# Patient Record
Sex: MALE | Race: WHITE | Employment: OTHER | URBAN - METROPOLITAN AREA
[De-identification: names, ages, dates, MRNs, and addresses within clinical notes are randomized per-mention and may not be internally consistent; named-entity substitution may affect disease eponyms.]

---

## 2017-03-23 ENCOUNTER — OP HISTORICAL/CONVERTED ENCOUNTER (OUTPATIENT)
Dept: OTHER | Age: 67
End: 2017-03-23

## 2017-04-28 ENCOUNTER — OP HISTORICAL/CONVERTED ENCOUNTER (OUTPATIENT)
Dept: OTHER | Age: 67
End: 2017-04-28

## 2017-07-21 ENCOUNTER — OP HISTORICAL/CONVERTED ENCOUNTER (OUTPATIENT)
Dept: OTHER | Age: 67
End: 2017-07-21

## 2017-08-14 ENCOUNTER — OP HISTORICAL/CONVERTED ENCOUNTER (OUTPATIENT)
Dept: OTHER | Age: 67
End: 2017-08-14

## 2017-09-07 ENCOUNTER — OP HISTORICAL/CONVERTED ENCOUNTER (OUTPATIENT)
Dept: OTHER | Age: 67
End: 2017-09-07

## 2017-09-12 ENCOUNTER — IP HISTORICAL/CONVERTED ENCOUNTER (OUTPATIENT)
Dept: OTHER | Age: 67
End: 2017-09-12

## 2018-09-27 ENCOUNTER — OP HISTORICAL/CONVERTED ENCOUNTER (OUTPATIENT)
Dept: OTHER | Age: 68
End: 2018-09-27

## 2018-11-28 ENCOUNTER — OP HISTORICAL/CONVERTED ENCOUNTER (OUTPATIENT)
Dept: OTHER | Age: 68
End: 2018-11-28

## 2019-12-16 ENCOUNTER — ED HISTORICAL/CONVERTED ENCOUNTER (OUTPATIENT)
Dept: OTHER | Age: 69
End: 2019-12-16

## 2020-07-23 ENCOUNTER — ED HISTORICAL/CONVERTED ENCOUNTER (OUTPATIENT)
Dept: OTHER | Age: 70
End: 2020-07-23

## 2020-08-31 ENCOUNTER — TELEPHONE (OUTPATIENT)
Dept: INTERNAL MEDICINE CLINIC | Age: 70
End: 2020-08-31

## 2020-11-04 ENCOUNTER — TELEPHONE (OUTPATIENT)
Dept: INTERNAL MEDICINE CLINIC | Age: 70
End: 2020-11-04

## 2020-11-04 PROBLEM — J01.90 ACUTE SINUSITIS: Status: ACTIVE | Noted: 2020-11-04

## 2020-11-04 PROBLEM — M46.1 INFLAMMATION OF SACROILIAC JOINT (HCC): Status: ACTIVE | Noted: 2020-11-04

## 2020-11-04 PROBLEM — I10 BENIGN ESSENTIAL HYPERTENSION: Status: ACTIVE | Noted: 2020-11-04

## 2020-11-04 PROBLEM — M25.569 KNEE PAIN: Status: ACTIVE | Noted: 2020-11-04

## 2020-11-04 PROBLEM — G47.33 OBSTRUCTIVE SLEEP APNEA: Status: ACTIVE | Noted: 2020-11-04

## 2020-11-04 PROBLEM — M71.9 DISORDER OF BURSAE OF SHOULDER REGION: Status: ACTIVE | Noted: 2020-11-04

## 2020-11-04 PROBLEM — E78.5 HYPERLIPIDEMIA: Status: ACTIVE | Noted: 2020-11-04

## 2020-11-04 PROBLEM — R35.1 NOCTURIA: Status: ACTIVE | Noted: 2020-11-04

## 2020-11-04 PROBLEM — M19.019 OSTEOARTHRITIS OF SHOULDER: Status: ACTIVE | Noted: 2020-11-04

## 2020-11-04 PROBLEM — R00.1 BRADYCARDIA: Status: ACTIVE | Noted: 2020-11-04

## 2020-11-04 PROBLEM — J06.9 URI (UPPER RESPIRATORY INFECTION): Status: ACTIVE | Noted: 2020-11-04

## 2020-11-05 ENCOUNTER — OFFICE VISIT (OUTPATIENT)
Dept: INTERNAL MEDICINE CLINIC | Age: 70
End: 2020-11-05
Payer: MEDICARE

## 2020-11-05 VITALS
BODY MASS INDEX: 28.46 KG/M2 | OXYGEN SATURATION: 98 % | HEIGHT: 70 IN | HEART RATE: 98 BPM | DIASTOLIC BLOOD PRESSURE: 72 MMHG | WEIGHT: 198.8 LBS | RESPIRATION RATE: 18 BRPM | TEMPERATURE: 98 F | SYSTOLIC BLOOD PRESSURE: 132 MMHG

## 2020-11-05 DIAGNOSIS — Z99.89 OSA ON CPAP: ICD-10-CM

## 2020-11-05 DIAGNOSIS — N52.9 IMPOTENCE: ICD-10-CM

## 2020-11-05 DIAGNOSIS — Z12.11 COLON CANCER SCREENING: ICD-10-CM

## 2020-11-05 DIAGNOSIS — I10 BENIGN ESSENTIAL HYPERTENSION: ICD-10-CM

## 2020-11-05 DIAGNOSIS — M19.011 OSTEOARTHRITIS OF RIGHT SHOULDER, UNSPECIFIED OSTEOARTHRITIS TYPE: ICD-10-CM

## 2020-11-05 DIAGNOSIS — R60.0 LOCALIZED EDEMA: ICD-10-CM

## 2020-11-05 DIAGNOSIS — G47.33 OSA ON CPAP: ICD-10-CM

## 2020-11-05 DIAGNOSIS — Z23 ENCOUNTER FOR IMMUNIZATION: ICD-10-CM

## 2020-11-05 DIAGNOSIS — Z12.5 PROSTATE CANCER SCREENING: ICD-10-CM

## 2020-11-05 DIAGNOSIS — E78.00 PURE HYPERCHOLESTEROLEMIA: ICD-10-CM

## 2020-11-05 DIAGNOSIS — I10 ESSENTIAL HYPERTENSION: Primary | ICD-10-CM

## 2020-11-05 LAB
BILIRUB UR QL STRIP: NEGATIVE
GLUCOSE UR-MCNC: NEGATIVE MG/DL
KETONES P FAST UR STRIP-MCNC: NEGATIVE MG/DL
PH UR STRIP: 7.5 [PH] (ref 4.6–8)
PROT UR QL STRIP: NEGATIVE
SP GR UR STRIP: 1.02 (ref 1–1.03)
UA UROBILINOGEN AMB POC: NORMAL (ref 0.2–1)
URINALYSIS CLARITY POC: CLEAR
URINALYSIS COLOR POC: YELLOW
URINE BLOOD POC: NEGATIVE
URINE LEUKOCYTES POC: NEGATIVE
URINE NITRITES POC: NEGATIVE

## 2020-11-05 PROCEDURE — 99214 OFFICE O/P EST MOD 30 MIN: CPT | Performed by: INTERNAL MEDICINE

## 2020-11-05 PROCEDURE — 81003 URINALYSIS AUTO W/O SCOPE: CPT | Performed by: INTERNAL MEDICINE

## 2020-11-05 PROCEDURE — 90732 PPSV23 VACC 2 YRS+ SUBQ/IM: CPT | Performed by: INTERNAL MEDICINE

## 2020-11-05 RX ORDER — AMLODIPINE BESYLATE 5 MG/1
TABLET ORAL
Qty: 180 TAB | Refills: 1 | Status: SHIPPED | OUTPATIENT
Start: 2020-11-05 | End: 2021-05-03 | Stop reason: SDUPTHER

## 2020-11-05 RX ORDER — NAPROXEN SODIUM 220 MG
220 TABLET ORAL 2 TIMES DAILY WITH MEALS
COMMUNITY

## 2020-11-05 RX ORDER — TADALAFIL 10 MG/1
10 TABLET ORAL AS NEEDED
Qty: 12 TAB | Refills: 0 | Status: SHIPPED | OUTPATIENT
Start: 2020-11-05 | End: 2020-11-13

## 2020-11-05 NOTE — PROGRESS NOTES
Corine Snellen is a 81 y.o. male and presents with Annual Wellness Visit  Patient presents for follow-up has been seen in the past by Dr. Aiden Randall for shoulder surgery and is doing quite well continues with his exercises he is also followed by Dr. Maribeth Ramesh pulmonary specialist for obstructive sleep apnea and continues with the CPAP mask all doing well while here we did perform yearly Medicare wellness he did well on an Alzheimer's clock test and scored a 3 out of 3 on recent recall we discussed preventive care issues and he is requesting pneumococcal 23 vaccine is already received the flu vaccine October this year he has advanced directives we discussed tobacco and in which she never uses alcohol to overuse maintains physical activity walks daily 3 miles which takes him about an hour with his wife we discussed screening colonoscopy has had guaiac stool testing year ago but is agreeable for Cologuard testing and we discussed this he maintains a healthy diet hearing is fine we discussed vehicle home safety he does not have depression on screening we discussed erectile dysfunction.   He has been using Viagra without help did not want referral to the urologist for other procedures we discussed vascular leak low testosterone he was agreeable for trying a higher dose of Cialis which she had tried in the past low-dose and see if that helps he is aware the risk and benefits of the medication last labs October of last year yielded a glucose of 80 creatinine 0.92 potassium 4.5 normal hepatocellular enzymes cholesterol 214  TSH 2.4 PSA 2.4 and labs drawn today to recheck those studies by the office phlebotomist he has gained 1.4 pounds since last visit continues on amlodipine 10 mg tablet takes half twice a day wanted a 5 mg tablet take twice a day when he takes the whole 10 mg he says he does not feel right he has more peripheral edema he will consider shingles vaccine later hemoglobin 15.4 last year           Review of Systems  Constitutional: negative for fevers, chills, anorexia, weight loss and fatigue,no insomnia- noted TOSHA  Eyes:   negative for visual disturbance and irritation, eye discharge, eye pain. no eye redness. ENT:   negative for tinnitus, sore throat, nasal congestion, ear pain, hoarseness, hearing loss.,no snoring. Respiratory:  negative for cough, hemoptysis, shortness of breath, wheezing,  CV:   negative for chest pain, palpitations, lower extremity edema, shortness of breath while sitting, walking or at night-leg edema  GI:   negative for nausea, vomiting, diarrhea, abdominal pain,melena,constipation. Endo:               negative for polyuria, polydipsia, polyphagia, cold or heat intolerance,hair loss. Genitourinary: negative for frequency, dysuria and hematuria,urethral discharge,nocturia.straining while urination,urinary incontinence. Integument:  negative for rash and pruritus  Hematologic:  negative for easy bruising and gum/nose bleeding, enlarged nodes  Musculoskel: negative for myalgias, arthralgias, back pain, muscle weakness, joint pain, h/o fall,cramps,calf pain. djd  Neurological:  negative for headaches, dizziness, vertigo, memory problems, gait and seizures loss of consciousness,no ataxia.   Behavl/Psych: negative for feelings of anxiety, depression, mood changes ,sadness    Past Medical History:   Diagnosis Date    Acute sinusitis 11/4/2020    Benign essential hypertension 11/4/2020    Bradycardia 11/4/2020    Disorder of bursae of shoulder region 11/4/2020    Hyperlipidemia 11/4/2020    Inflammation of sacroiliac joint ([address] 11/4/2020    Knee pain 11/4/2020    Nocturia 11/4/2020    Obstructive sleep apnea 11/4/2020    Osteoarthritis of shoulder 11/4/2020    URI (upper respiratory infection) 11/4/2020     Past Surgical History:   Procedure Laterality Date    HX APPENDECTOMY  1968    HX COLONOSCOPY  2007    HX HERNIA REPAIR  2014    HX ORTHOPAEDIC  2017    total right shoulder replacement     Social History     Socioeconomic History    Marital status:      Spouse name: Not on file    Number of children: Not on file    Years of education: Not on file    Highest education level: Not on file   Tobacco Use    Smoking status: Never Smoker    Smokeless tobacco: Never Used   Substance and Sexual Activity    Alcohol use: Yes     Comment: moderate     Family History   Problem Relation Age of Onset    Hypertension Father      Current Outpatient Medications   Medication Sig Dispense Refill    naproxen sodium (NAPROSYN) 220 mg tablet Take 220 mg by mouth two (2) times daily (with meals).  tadalafiL (CIALIS) 10 mg tablet Take 1 Tab by mouth as needed for Erectile Dysfunction for up to 30 days. 12 Tab 0    amLODIPine (NORVASC) 5 mg tablet 1 bid 180 Tab 1     Allergies   Allergen Reactions    Ace Inhibitors Cough       Objective:  Visit Vitals  /72 (BP 1 Location: Right arm, BP Patient Position: Sitting)   Pulse 98   Temp 98 °F (36.7 °C) (Oral)   Resp 18   Ht 5' 10\" (1.778 m)   Wt 198 lb 12.8 oz (90.2 kg)   SpO2 98%   BMI 28.52 kg/m²       Physical Exam:   Constitutional: General Appearance: healthy-appearing and obese. Level of Distress: NAD. Ambulation: ambulating normally. Psychiatric: Mental Status: normal mood and affect and active and alert. Orientation: to time, place, and person. no agitation. ,normal eye contact. normal insight  Head: Head: normocephalic and atraumatic. Eyes: Pupils: PERRLA. Sclerae: non-icteric. ENMT: No lesions on external ear, no hearing loss. No lesions on external nose, sinus tenderness, or nasal discharge. Lips, Teeth, and no mouth or lip ulcers   Neck: Neck: supple, trachea midline, and no masses. Lymph Nodes: no cervical LAD. Thyroid: no enlargement or nodules and non-tender. Lungs: Respiratory effort: no dyspnea. Auscultation: no wheezing, rales/crackles, or rhonchi and breath sounds normal and good air movement.    Cardiovascular: Apical Impulse: not displaced. Heart Auscultation: normal S1 and S2; no murmurs, rubs, or gallops; and RRR. Neck vessels: no carotid bruits. Pulses including femoral / pedal: normal throughout. Abdomen: Bowel Sounds: normal. Inspection and Palpation: no tenderness, guarding, or masses and soft and non-distended. Liver: non-tender and no hepatomegaly. Spleen: non-tender and no splenomegaly. Musculoskeletal[de-identified] Extremities: no edema or varicosities. Calf tenderness. djd  Neurologic: Gait and Station: normal gait and station. Motor Strength normal right and left. Sensory and cerebellar intact. Skin: Inspection and palpation: no rash, lesions, or ulcer. Edema noted      Results for orders placed or performed in visit on 11/05/20   AMB POC URINALYSIS DIP STICK AUTO W/O MICRO   Result Value Ref Range    Color (UA POC) Yellow     Clarity (UA POC) Clear     Glucose (UA POC) Negative Negative    Bilirubin (UA POC) Negative Negative    Ketones (UA POC) Negative Negative    Specific gravity (UA POC) 1.025 1.001 - 1.035    Blood (UA POC) Negative Negative    pH (UA POC) 7.5 4.6 - 8.0    Protein (UA POC) Negative Negative    Urobilinogen (UA POC) 0.2 mg/dL 0.2 - 1    Nitrites (UA POC) Negative Negative    Leukocyte esterase (UA POC) Negative Negative       Assessment/Plan:    ICD-10-CM ICD-9-CM    1. Essential hypertension  I10 401.9 CBC WITH AUTOMATED DIFF      METABOLIC PANEL, COMPREHENSIVE      TSH 3RD GENERATION      AMB POC URINALYSIS DIP STICK AUTO W/O MICRO   2. Pure hypercholesterolemia  E78.00 272.0 LIPID PANEL   3. Prostate cancer screening  Z12.5 V76.44 PSA W/ REFLX FREE PSA   4. Encounter for immunization  Z23 V03.89 PNEUMOCOCCAL POLYSACCHARIDE VACCINE, 23-VALENT, ADULT OR IMMUNOSUPPRESSED PT DOSE,   5. Localized edema  R60.0 782.3    6. Impotence  N52.9 607.84    7. Osteoarthritis of right shoulder, unspecified osteoarthritis type  M19.011 715.91    8. Benign essential hypertension  I10 401.1    9.  TOSHA on CPAP G47.33 327.23     Z99.89 V46.8    10. Colon cancer screening  Z12.11 V76.51 COLOGUARD TEST (FECAL DNA COLORECTAL CANCER SCREENING)     Orders Placed This Encounter    PNEUMOCOCCAL POLYSACCHARIDE VACCINE, 23-VALENT, ADULT OR IMMUNOSUPPRESSED PT DOSE,    CBC WITH AUTOMATED DIFF    METABOLIC PANEL, COMPREHENSIVE    PSA W/ REFLX FREE PSA    LIPID PANEL    TSH 3RD GENERATION    COLOGUARD TEST (FECAL DNA COLORECTAL CANCER SCREENING)    AMB POC URINALYSIS DIP STICK AUTO W/O MICRO    naproxen sodium (NAPROSYN) 220 mg tablet     Sig: Take 220 mg by mouth two (2) times daily (with meals).  tadalafiL (CIALIS) 10 mg tablet     Sig: Take 1 Tab by mouth as needed for Erectile Dysfunction for up to 30 days. Dispense:  12 Tab     Refill:  0    amLODIPine (NORVASC) 5 mg tablet     Si bid     Dispense:  180 Tab     Refill:  1       call if any problems    There are no Patient Instructions on file for this visit. Follow-up and Dispositions    · Return in about 6 months (around 2021). Giovanna Donnelly is a 81 y.o. male and presents with Annual Wellness Visit             Review of Systems  Constitutional: negative for fevers, chills, anorexia, weight loss and fatigue,no insomnia  Eyes:   negative for visual disturbance and irritation, eye discharge, eye pain. no eye redness. ENT:   negative for tinnitus, sore throat, nasal congestion, ear pain, hoarseness, hearing loss.,no snoring. Respiratory:  negative for cough, hemoptysis, shortness of breath, wheezing,  CV:   negative for chest pain, palpitations, lower extremity edema, shortness of breath while sitting, walking or at night  GI:   negative for nausea, vomiting, diarrhea, abdominal pain,melena,constipation. Endo:               negative for polyuria, polydipsia, polyphagia, cold or heat intolerance,hair loss.   Genitourinary: negative for frequency, dysuria and hematuria,urethral discharge,nocturia.straining while urination,urinary incontinence. Integument:  negative for rash and pruritus  Hematologic:  negative for easy bruising and gum/nose bleeding, enlarged nodes  Musculoskel: negative for myalgias, arthralgias, back pain, muscle weakness, joint pain, h/o fall,cramps,calf pain. Neurological:  negative for headaches, dizziness, vertigo, memory problems, gait and seizures loss of consciousness,no ataxia. Behavl/Psych: negative for feelings of anxiety, depression, mood changes ,sadness    Past Medical History:   Diagnosis Date    Acute sinusitis 11/4/2020    Benign essential hypertension 11/4/2020    Bradycardia 11/4/2020    Disorder of bursae of shoulder region 11/4/2020    Hyperlipidemia 11/4/2020    Inflammation of sacroiliac joint ([address] 11/4/2020    Knee pain 11/4/2020    Nocturia 11/4/2020    Obstructive sleep apnea 11/4/2020    Osteoarthritis of shoulder 11/4/2020    URI (upper respiratory infection) 11/4/2020     Past Surgical History:   Procedure Laterality Date    HX APPENDECTOMY  1968    HX COLONOSCOPY  2007    HX HERNIA REPAIR  2014    HX ORTHOPAEDIC  2017    total right shoulder replacement     Social History     Socioeconomic History    Marital status:      Spouse name: Not on file    Number of children: Not on file    Years of education: Not on file    Highest education level: Not on file   Tobacco Use    Smoking status: Never Smoker    Smokeless tobacco: Never Used   Substance and Sexual Activity    Alcohol use: Yes     Comment: moderate     Family History   Problem Relation Age of Onset    Hypertension Father      Current Outpatient Medications   Medication Sig Dispense Refill    naproxen sodium (NAPROSYN) 220 mg tablet Take 220 mg by mouth two (2) times daily (with meals).  tadalafiL (CIALIS) 10 mg tablet Take 1 Tab by mouth as needed for Erectile Dysfunction for up to 30 days.  12 Tab 0    amLODIPine (NORVASC) 5 mg tablet 1 bid 180 Tab 1     Allergies   Allergen Reactions    Ace Inhibitors Cough       Objective:  Visit Vitals  /72 (BP 1 Location: Right arm, BP Patient Position: Sitting)   Pulse 98   Temp 98 °F (36.7 °C) (Oral)   Resp 18   Ht 5' 10\" (1.778 m)   Wt 198 lb 12.8 oz (90.2 kg)   SpO2 98%   BMI 28.52 kg/m²       Physical Exam:   Constitutional: General Appearance: healthy-appearing and obese. Level of Distress: NAD. Ambulation: ambulating normally. Psychiatric: Mental Status: normal mood and affect and active and alert. Orientation: to time, place, and person. no agitation. ,normal eye contact. normal insight  Head: Head: normocephalic and atraumatic. Eyes: Pupils: PERRLA. Sclerae: non-icteric. ENMT: No lesions on external ear, no hearing loss. No lesions on external nose, sinus tenderness, or nasal discharge. Lips, Teeth, and no mouth or lip ulcers   Neck: Neck: supple, trachea midline, and no masses. Lymph Nodes: no cervical LAD. Thyroid: no enlargement or nodules and non-tender. Lungs: Respiratory effort: no dyspnea. Auscultation: no wheezing, rales/crackles, or rhonchi and breath sounds normal and good air movement. Breast:   Cardiovascular: Apical Impulse: not displaced. Heart Auscultation: normal S1 and S2; no murmurs, rubs, or gallops; and RRR. Neck vessels: no carotid bruits. Pulses including femoral / pedal: normal throughout. Abdomen: Bowel Sounds: normal. Inspection and Palpation: no tenderness, guarding, or masses and soft and non-distended. Liver: non-tender and no hepatomegaly. Spleen: non-tender and no splenomegaly. :    Rectal Exam:  Musculoskeletal[de-identified] Extremities: no edema or varicosities. Calf tenderness. Neurologic: Gait and Station: normal gait and station. Motor Strength normal right and left. Sensory and cerebellar intact. Skin: Inspection and palpation: no rash, lesions, or ulcer.        Results for orders placed or performed in visit on 11/05/20   AMB POC URINALYSIS DIP STICK AUTO W/O MICRO   Result Value Ref Range    Color (UA POC) Yellow     Clarity (UA POC) Clear     Glucose (UA POC) Negative Negative    Bilirubin (UA POC) Negative Negative    Ketones (UA POC) Negative Negative    Specific gravity (UA POC) 1.025 1.001 - 1.035    Blood (UA POC) Negative Negative    pH (UA POC) 7.5 4.6 - 8.0    Protein (UA POC) Negative Negative    Urobilinogen (UA POC) 0.2 mg/dL 0.2 - 1    Nitrites (UA POC) Negative Negative    Leukocyte esterase (UA POC) Negative Negative       Assessment/Plan:    ICD-10-CM ICD-9-CM    1. Essential hypertension  I10 401.9 CBC WITH AUTOMATED DIFF      METABOLIC PANEL, COMPREHENSIVE      TSH 3RD GENERATION      AMB POC URINALYSIS DIP STICK AUTO W/O MICRO   2. Pure hypercholesterolemia  E78.00 272.0 LIPID PANEL   3. Prostate cancer screening  Z12.5 V76.44 PSA W/ REFLX FREE PSA   4. Encounter for immunization  Z23 V03.89 PNEUMOCOCCAL POLYSACCHARIDE VACCINE, 23-VALENT, ADULT OR IMMUNOSUPPRESSED PT DOSE,   5. Localized edema  R60.0 782.3    6. Impotence  N52.9 607.84    7. Osteoarthritis of right shoulder, unspecified osteoarthritis type  M19.011 715.91    8. Benign essential hypertension  I10 401.1    9. TOSHA on CPAP  G47.33 327.23     Z99.89 V46.8    10. Colon cancer screening  Z12.11 V76.51 COLOGUARD TEST (FECAL DNA COLORECTAL CANCER SCREENING)     Orders Placed This Encounter    PNEUMOCOCCAL POLYSACCHARIDE VACCINE, 23-VALENT, ADULT OR IMMUNOSUPPRESSED PT DOSE,    CBC WITH AUTOMATED DIFF    METABOLIC PANEL, COMPREHENSIVE    PSA W/ REFLX FREE PSA    LIPID PANEL    TSH 3RD GENERATION    COLOGUARD TEST (FECAL DNA COLORECTAL CANCER SCREENING)    AMB POC URINALYSIS DIP STICK AUTO W/O MICRO    naproxen sodium (NAPROSYN) 220 mg tablet     Sig: Take 220 mg by mouth two (2) times daily (with meals).  tadalafiL (CIALIS) 10 mg tablet     Sig: Take 1 Tab by mouth as needed for Erectile Dysfunction for up to 30 days.      Dispense:  12 Tab     Refill:  0    amLODIPine (NORVASC) 5 mg tablet     Si bid     Dispense:  180 Tab Refill:  1       increase physical activity, routine labs ordered, call if any problems    There are no Patient Instructions on file for this visit. Follow-up and Dispositions    · Return in about 6 months (around 5/5/2021).

## 2020-11-06 LAB
ALBUMIN SERPL-MCNC: 4.3 G/DL (ref 3.8–4.8)
ALBUMIN/GLOB SERPL: 1.5 {RATIO} (ref 1.2–2.2)
ALP SERPL-CCNC: 67 IU/L (ref 39–117)
ALT SERPL-CCNC: 24 IU/L (ref 0–44)
AST SERPL-CCNC: 21 IU/L (ref 0–40)
BASOPHILS # BLD AUTO: 0.1 X10E3/UL (ref 0–0.2)
BASOPHILS NFR BLD AUTO: 1 %
BILIRUB SERPL-MCNC: 0.6 MG/DL (ref 0–1.2)
BUN SERPL-MCNC: 18 MG/DL (ref 8–27)
BUN/CREAT SERPL: 17 (ref 10–24)
CALCIUM SERPL-MCNC: 9.2 MG/DL (ref 8.6–10.2)
CHLORIDE SERPL-SCNC: 107 MMOL/L (ref 96–106)
CHOLEST SERPL-MCNC: 194 MG/DL (ref 100–199)
CO2 SERPL-SCNC: 27 MMOL/L (ref 20–29)
CREAT SERPL-MCNC: 1.04 MG/DL (ref 0.76–1.27)
EOSINOPHIL # BLD AUTO: 0.3 X10E3/UL (ref 0–0.4)
EOSINOPHIL NFR BLD AUTO: 3 %
ERYTHROCYTE [DISTWIDTH] IN BLOOD BY AUTOMATED COUNT: 11.9 % (ref 11.6–15.4)
GLOBULIN SER CALC-MCNC: 2.8 G/DL (ref 1.5–4.5)
GLUCOSE SERPL-MCNC: 84 MG/DL (ref 65–99)
HCT VFR BLD AUTO: 46.8 % (ref 37.5–51)
HDLC SERPL-MCNC: 89 MG/DL
HGB BLD-MCNC: 15.6 G/DL (ref 13–17.7)
IMM GRANULOCYTES # BLD AUTO: 0 X10E3/UL (ref 0–0.1)
IMM GRANULOCYTES NFR BLD AUTO: 0 %
LDLC SERPL CALC-MCNC: 87 MG/DL (ref 0–99)
LYMPHOCYTES # BLD AUTO: 1.9 X10E3/UL (ref 0.7–3.1)
LYMPHOCYTES NFR BLD AUTO: 25 %
MCH RBC QN AUTO: 28.7 PG (ref 26.6–33)
MCHC RBC AUTO-ENTMCNC: 33.3 G/DL (ref 31.5–35.7)
MCV RBC AUTO: 86 FL (ref 79–97)
MONOCYTES # BLD AUTO: 0.9 X10E3/UL (ref 0.1–0.9)
MONOCYTES NFR BLD AUTO: 12 %
NEUTROPHILS # BLD AUTO: 4.5 X10E3/UL (ref 1.4–7)
NEUTROPHILS NFR BLD AUTO: 59 %
PLATELET # BLD AUTO: 290 X10E3/UL (ref 150–450)
POTASSIUM SERPL-SCNC: 4.3 MMOL/L (ref 3.5–5.2)
PROT SERPL-MCNC: 7.1 G/DL (ref 6–8.5)
PSA SERPL-MCNC: 2.5 NG/ML (ref 0–4)
RBC # BLD AUTO: 5.43 X10E6/UL (ref 4.14–5.8)
REFLEX CRITERIA: NORMAL
SODIUM SERPL-SCNC: 144 MMOL/L (ref 134–144)
TRIGL SERPL-MCNC: 107 MG/DL (ref 0–149)
TSH SERPL DL<=0.005 MIU/L-ACNC: 3.45 UIU/ML (ref 0.45–4.5)
VLDLC SERPL CALC-MCNC: 18 MG/DL (ref 5–40)
WBC # BLD AUTO: 7.6 X10E3/UL (ref 3.4–10.8)

## 2020-11-13 ENCOUNTER — TELEPHONE (OUTPATIENT)
Dept: INTERNAL MEDICINE CLINIC | Age: 70
End: 2020-11-13

## 2020-11-13 DIAGNOSIS — N52.9 ERECTILE DYSFUNCTION, UNSPECIFIED ERECTILE DYSFUNCTION TYPE: Primary | ICD-10-CM

## 2020-11-13 RX ORDER — SILDENAFIL 100 MG/1
100 TABLET, FILM COATED ORAL AS NEEDED
Qty: 12 TAB | Refills: 1 | Status: SHIPPED | OUTPATIENT
Start: 2020-11-13 | End: 2020-12-07

## 2021-01-26 ENCOUNTER — IMMUNIZATION (OUTPATIENT)
Dept: FAMILY MEDICINE CLINIC | Age: 71
End: 2021-01-26
Payer: MEDICARE

## 2021-01-26 DIAGNOSIS — Z23 ENCOUNTER FOR IMMUNIZATION: Primary | ICD-10-CM

## 2021-01-26 PROCEDURE — 96372 THER/PROPH/DIAG INJ SC/IM: CPT | Performed by: FAMILY MEDICINE

## 2021-01-26 PROCEDURE — 91301 COVID-19, MRNA, LNP-S, PF, 100MCG/0.5ML DOSE(MODERNA): CPT | Performed by: FAMILY MEDICINE

## 2021-02-24 ENCOUNTER — IMMUNIZATION (OUTPATIENT)
Dept: FAMILY MEDICINE CLINIC | Age: 71
End: 2021-02-24
Payer: MEDICARE

## 2021-02-24 DIAGNOSIS — Z23 ENCOUNTER FOR IMMUNIZATION: Primary | ICD-10-CM

## 2021-02-24 PROCEDURE — 0012A COVID-19, MRNA, LNP-S, PF, 100MCG/0.5ML DOSE(MODERNA): CPT | Performed by: FAMILY MEDICINE

## 2021-02-24 PROCEDURE — 91301 COVID-19, MRNA, LNP-S, PF, 100MCG/0.5ML DOSE(MODERNA): CPT | Performed by: FAMILY MEDICINE

## 2021-05-03 RX ORDER — AMLODIPINE BESYLATE 5 MG/1
TABLET ORAL
Qty: 180 TAB | Refills: 1 | Status: SHIPPED | OUTPATIENT
Start: 2021-05-03 | End: 2021-10-30

## 2021-05-05 ENCOUNTER — OFFICE VISIT (OUTPATIENT)
Dept: INTERNAL MEDICINE CLINIC | Age: 71
End: 2021-05-05
Payer: MEDICARE

## 2021-05-05 VITALS
BODY MASS INDEX: 28.35 KG/M2 | TEMPERATURE: 98 F | HEIGHT: 70 IN | RESPIRATION RATE: 18 BRPM | SYSTOLIC BLOOD PRESSURE: 122 MMHG | WEIGHT: 198 LBS | DIASTOLIC BLOOD PRESSURE: 72 MMHG | HEART RATE: 64 BPM | OXYGEN SATURATION: 99 %

## 2021-05-05 DIAGNOSIS — R01.1 SYSTOLIC MURMUR: ICD-10-CM

## 2021-05-05 DIAGNOSIS — M25.562 CHRONIC PAIN OF LEFT KNEE: ICD-10-CM

## 2021-05-05 DIAGNOSIS — E78.00 PURE HYPERCHOLESTEROLEMIA: ICD-10-CM

## 2021-05-05 DIAGNOSIS — G89.29 CHRONIC PAIN OF LEFT KNEE: ICD-10-CM

## 2021-05-05 DIAGNOSIS — I10 BENIGN ESSENTIAL HYPERTENSION: Primary | ICD-10-CM

## 2021-05-05 PROCEDURE — G8536 NO DOC ELDER MAL SCRN: HCPCS | Performed by: INTERNAL MEDICINE

## 2021-05-05 PROCEDURE — G8752 SYS BP LESS 140: HCPCS | Performed by: INTERNAL MEDICINE

## 2021-05-05 PROCEDURE — 1101F PT FALLS ASSESS-DOCD LE1/YR: CPT | Performed by: INTERNAL MEDICINE

## 2021-05-05 PROCEDURE — G8510 SCR DEP NEG, NO PLAN REQD: HCPCS | Performed by: INTERNAL MEDICINE

## 2021-05-05 PROCEDURE — 99214 OFFICE O/P EST MOD 30 MIN: CPT | Performed by: INTERNAL MEDICINE

## 2021-05-05 PROCEDURE — 3017F COLORECTAL CA SCREEN DOC REV: CPT | Performed by: INTERNAL MEDICINE

## 2021-05-05 PROCEDURE — G8754 DIAS BP LESS 90: HCPCS | Performed by: INTERNAL MEDICINE

## 2021-05-05 PROCEDURE — G8427 DOCREV CUR MEDS BY ELIG CLIN: HCPCS | Performed by: INTERNAL MEDICINE

## 2021-05-05 PROCEDURE — G8419 CALC BMI OUT NRM PARAM NOF/U: HCPCS | Performed by: INTERNAL MEDICINE

## 2021-05-05 NOTE — PROGRESS NOTES
Tim Rider is a 78 y.o. male and presents with Follow Up Chronic Condition (numbness in arms in the mornings)    Patient presents for follow-up he is doing quite well since his right dominant shoulder surgery by Dr. Doni Villatoro has not seen Dr. Doni Villatoro in almost a year he is doing well now he does continue his exercise daily range of motion is much improved he does have a 2/6 holosystolic murmur which is old we discussed the possibility of a 2D echo but the patient says he has had no syncopal no chest discomfort no problems and wishes to reevaluate later in the year when he returns again we did review his last labs November of last year cholesterol 194 triglycerides 107 LDL 87 PSA 2.5 white count 76-hemoglobin 15.6 platelet count [id number] patient has received the [place] coronavirus vaccine x2 we discussed coronavirus pandemic evaluation treatment and vaccine generally he is doing quite well labs November of last year also include glucose of 84 creatinine 1.04 sodium 144 potassium 4.3 normal hepatocellular enzymes-patient has some chronic left knee pain no crepitus range of motion okay but is not that bothersome and he did not want to follow-up with his orthopedist for anticipated surgical procedure later he will hold off for now 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 no eye redness. ENT:   negative for tinnitus, sore throat, nasal congestion, ear pain, hoarseness, hearing loss.,no snoring.   Respiratory:  negative for cough, hemoptysis, shortness of breath, wheezing,  CV:   negative for chest pain, palpitations, lower extremity edema, shortness of breath while sitting, walking or at night noted systolic murmur old  GI:   negative for nausea, vomiting, diarrhea, abdominal pain,melena,constipation. Endo:               negative for polyuria, polydipsia, polyphagia, cold or heat intolerance,hair loss. Genitourinary: negative for frequency, dysuria and hematuria,urethral discharge,nocturia.straining while urination,urinary incontinence. Integument:  negative for rash and pruritus  Hematologic:  negative for easy bruising and gum/nose bleeding, enlarged nodes  Musculoskel: negative for myalgias, arthralgias, back pain, muscle weakness, joint pain, h/o fall,cramps,calf pain. DJD particularly knees range of motion right arm at shoulder much improved  Neurological:  negative for headaches, dizziness, vertigo, memory problems, gait and seizures loss of consciousness,no ataxia.   Behavl/Psych: negative for feelings of anxiety, depression, mood changes ,sadness    Past Medical History:   Diagnosis Date    Acute sinusitis 11/4/2020    Benign essential hypertension 11/4/2020    Bradycardia 11/4/2020    Disorder of bursae of shoulder region 11/4/2020    Hyperlipidemia 11/4/2020    Inflammation of sacroiliac joint ([address] 11/4/2020    Knee pain 11/4/2020    Nocturia 11/4/2020    Obstructive sleep apnea 11/4/2020    Osteoarthritis of shoulder 11/4/2020    URI (upper respiratory infection) 11/4/2020     Past Surgical History:   Procedure Laterality Date    HX APPENDECTOMY  1968    HX COLONOSCOPY  2007    HX HERNIA REPAIR  2014    HX ORTHOPAEDIC  2017    total right shoulder replacement     Social History     Socioeconomic History    Marital status:      Spouse name: Not on file    Number of children: Not on file    Years of education: Not on file    Highest education level: Not on file   Tobacco Use    Smoking status: Never Smoker    Smokeless tobacco: Never Used   Substance and Sexual Activity    Alcohol use: Yes     Comment: moderate     Family History   Problem Relation Age of Onset    Hypertension Father Current Outpatient Medications   Medication Sig Dispense Refill    amLODIPine (NORVASC) 5 mg tablet 1 bid 180 Tab 1    naproxen sodium (NAPROSYN) 220 mg tablet Take 220 mg by mouth two (2) times daily (with meals). Allergies   Allergen Reactions    Ace Inhibitors Cough       Objective:  Visit Vitals  /72 (BP 1 Location: Left upper arm, BP Patient Position: Sitting)   Pulse 64   Temp 98 °F (36.7 °C) (Oral)   Resp 18   Ht 5' 10\" (1.778 m)   Wt 198 lb (89.8 kg)   SpO2 99%   BMI 28.41 kg/m²       Physical Exam:   Constitutional: General Appearance: healthy-appearing and obese. Level of Distress: NAD. Ambulation: ambulating normally. Psychiatric: Mental Status: normal mood and affect and active and alert. Orientation: to time, place, and person. no agitation. ,normal eye contact. normal insight  Head: Head: normocephalic and atraumatic. Eyes: Pupils: PERRLA. Sclerae: non-icteric. ENMT: No lesions on external ear, no hearing loss. No lesions on external nose, sinus tenderness, or nasal discharge. Lips, Teeth, and no mouth or lip ulcers   Neck: Neck: supple, trachea midline, and no masses. Lymph Nodes: no cervical LAD. Thyroid: no enlargement or nodules and non-tender. Lungs: Respiratory effort: no dyspnea. Auscultation: no wheezing, rales/crackles, or rhonchi and breath sounds normal and good air movement. Cardiovascular: Apical Impulse: not displaced. Heart Auscultation: normal S1 and S2; noted murmurs, rubs, or gallops; and RRR. Neck vessels: no carotid bruits. Pulses including femoral / pedal: normal throughout. Abdomen: Bowel Sounds: normal. Inspection and Palpation: no tenderness, guarding, or masses and soft and non-distended. Liver: non-tender  Musculoskeletal[de-identified] Extremities: no edema or varicosities. Calf tenderness. DJD changes  Neurologic: Gait and Station: normal gait and station. Motor Strength normal right and left. Sensory and cerebellar intact.   Skin: Inspection and palpation: no rash, lesions, or ulcer. Results for orders placed or performed in visit on 11/05/20   CBC WITH AUTOMATED DIFF   Result Value Ref Range    WBC 7.6 3.4 - 10.8 x10E3/uL    RBC 5.43 4.14 - 5.80 x10E6/uL    HGB 15.6 13.0 - 17.7 g/dL    HCT 46.8 37.5 - 51.0 %    MCV 86 79 - 97 fL    MCH 28.7 26.6 - 33.0 pg    MCHC 33.3 31.5 - 35.7 g/dL    RDW 11.9 11.6 - 15.4 %    PLATELET 600 267 - 977 x10E3/uL    NEUTROPHILS 59 Not Estab. %    Lymphocytes 25 Not Estab. %    MONOCYTES 12 Not Estab. %    EOSINOPHILS 3 Not Estab. %    BASOPHILS 1 Not Estab. %    ABS. NEUTROPHILS 4.5 1.4 - 7.0 x10E3/uL    Abs Lymphocytes 1.9 0.7 - 3.1 x10E3/uL    ABS. MONOCYTES 0.9 0.1 - 0.9 x10E3/uL    ABS. EOSINOPHILS 0.3 0.0 - 0.4 x10E3/uL    ABS. BASOPHILS 0.1 0.0 - 0.2 x10E3/uL    IMMATURE GRANULOCYTES 0 Not Estab. %    ABS. IMM. GRANS. 0.0 0.0 - 0.1 [id number]   METABOLIC PANEL, COMPREHENSIVE   Result Value Ref Range    Glucose 84 65 - 99 mg/dL    BUN 18 8 - 27 mg/dL    Creatinine 1.04 0.76 - 1.27 mg/dL    GFR est non-AA 74 >59 mL/min/1.73    GFR est AA 85 >59 mL/min/1.73    BUN/Creatinine ratio 17 10 - 24    Sodium 144 134 - 144 mmol/L    Potassium 4.3 3.5 - 5.2 mmol/L    Chloride 107 (H) 96 - 106 mmol/L    CO2 27 20 - 29 mmol/L    Calcium 9.2 8.6 - 10.2 mg/dL    Protein, total 7.1 6.0 - 8.5 g/dL    Albumin 4.3 3.8 - 4.8 g/dL    GLOBULIN, TOTAL 2.8 1.5 - 4.5 g/dL    A-G Ratio 1.5 1.2 - 2.2    Bilirubin, total 0.6 0.0 - 1.2 mg/dL    Alk.  phosphatase 67 39 - 117 IU/L    AST (SGOT) 21 0 - 40 IU/L    ALT (SGPT) 24 0 - 44 IU/L   PSA W/ REFLX FREE PSA   Result Value Ref Range    Prostate Specific Ag 2.5 0.0 - 4.0 ng/mL    Reflex Criteria Comment    LIPID PANEL   Result Value Ref Range    Cholesterol, total 194 100 - 199 mg/dL    Triglyceride 107 0 - 149 mg/dL    HDL Cholesterol 89 >39 mg/dL    VLDL, calculated 18 5 - 40 mg/dL    LDL, calculated 87 0 - 99 mg/dL   TSH 3RD GENERATION   Result Value Ref Range    TSH 3.450 0.450 - 4.500 uIU/mL   AMB POC URINALYSIS DIP STICK AUTO W/O MICRO   Result Value Ref Range    Color (UA POC) Yellow     Clarity (UA POC) Clear     Glucose (UA POC) Negative Negative    Bilirubin (UA POC) Negative Negative    Ketones (UA POC) Negative Negative    Specific gravity (UA POC) 1.025 1.001 - 1.035    Blood (UA POC) Negative Negative    pH (UA POC) 7.5 4.6 - 8.0    Protein (UA POC) Negative Negative    Urobilinogen (UA POC) 0.2 mg/dL 0.2 - 1    Nitrites (UA POC) Negative Negative    Leukocyte esterase (UA POC) Negative Negative       Assessment/Plan:    ICD-10-CM ICD-9-CM    1. Benign essential hypertension  I10 401.1    2. Systolic murmur  H23.9 033.8    3. Pure hypercholesterolemia  E78.00 272.0    4. Chronic pain of left knee  M25.562 719.46     G89.29 338.29      No orders of the defined types were placed in this encounter. call if any problems    There are no Patient Instructions on file for this visit.

## 2021-05-05 NOTE — PROGRESS NOTES
Chief Complaint   Patient presents with    Follow Up Chronic Condition               Visit Vitals  /72 (BP 1 Location: Left upper arm, BP Patient Position: Sitting)   Pulse 64   Temp 98 °F (36.7 °C) (Oral)   Resp 18   Ht 5' 10\" (1.778 m)   Wt 198 lb (89.8 kg)   SpO2 99%   BMI 28.41 kg/m²             1. Have you been to the ER, urgent care clinic since your last visit? Hospitalized since your last visit? No    2. Have you seen or consulted any other health care providers outside of the [address] [place] [address] since your last visit? Include any pap smears or colon screening.  NO

## 2021-10-30 RX ORDER — AMLODIPINE BESYLATE 5 MG/1
TABLET ORAL
Qty: 180 TABLET | Refills: 1 | Status: SHIPPED | OUTPATIENT
Start: 2021-10-30 | End: 2022-04-28 | Stop reason: SDUPTHER

## 2021-11-09 ENCOUNTER — OFFICE VISIT (OUTPATIENT)
Dept: INTERNAL MEDICINE CLINIC | Age: 71
End: 2021-11-09
Payer: MEDICARE

## 2021-11-09 VITALS
WEIGHT: 196.8 LBS | DIASTOLIC BLOOD PRESSURE: 84 MMHG | TEMPERATURE: 98 F | SYSTOLIC BLOOD PRESSURE: 128 MMHG | HEIGHT: 70 IN | OXYGEN SATURATION: 97 % | RESPIRATION RATE: 18 BRPM | HEART RATE: 62 BPM | BODY MASS INDEX: 28.18 KG/M2

## 2021-11-09 DIAGNOSIS — N52.9 IMPOTENCE: ICD-10-CM

## 2021-11-09 DIAGNOSIS — E78.00 PURE HYPERCHOLESTEROLEMIA: ICD-10-CM

## 2021-11-09 DIAGNOSIS — R53.83 OTHER FATIGUE: ICD-10-CM

## 2021-11-09 DIAGNOSIS — R01.1 SYSTOLIC MURMUR: ICD-10-CM

## 2021-11-09 DIAGNOSIS — G62.9 SENSORY NEUROPATHY: ICD-10-CM

## 2021-11-09 DIAGNOSIS — G47.33 OBSTRUCTIVE SLEEP APNEA: Primary | ICD-10-CM

## 2021-11-09 DIAGNOSIS — R20.2 ARM PARESTHESIA, LEFT: ICD-10-CM

## 2021-11-09 DIAGNOSIS — I10 BENIGN ESSENTIAL HYPERTENSION: ICD-10-CM

## 2021-11-09 DIAGNOSIS — N40.0 BENIGN PROSTATIC HYPERPLASIA, UNSPECIFIED WHETHER LOWER URINARY TRACT SYMPTOMS PRESENT: ICD-10-CM

## 2021-11-09 DIAGNOSIS — M19.011 PRIMARY OSTEOARTHRITIS OF RIGHT SHOULDER: ICD-10-CM

## 2021-11-09 DIAGNOSIS — R20.2 ARM PARESTHESIA, RIGHT: ICD-10-CM

## 2021-11-09 PROCEDURE — G8419 CALC BMI OUT NRM PARAM NOF/U: HCPCS | Performed by: INTERNAL MEDICINE

## 2021-11-09 PROCEDURE — G8754 DIAS BP LESS 90: HCPCS | Performed by: INTERNAL MEDICINE

## 2021-11-09 PROCEDURE — G8427 DOCREV CUR MEDS BY ELIG CLIN: HCPCS | Performed by: INTERNAL MEDICINE

## 2021-11-09 PROCEDURE — 1101F PT FALLS ASSESS-DOCD LE1/YR: CPT | Performed by: INTERNAL MEDICINE

## 2021-11-09 PROCEDURE — 99214 OFFICE O/P EST MOD 30 MIN: CPT | Performed by: INTERNAL MEDICINE

## 2021-11-09 PROCEDURE — G8752 SYS BP LESS 140: HCPCS | Performed by: INTERNAL MEDICINE

## 2021-11-09 PROCEDURE — G8510 SCR DEP NEG, NO PLAN REQD: HCPCS | Performed by: INTERNAL MEDICINE

## 2021-11-09 PROCEDURE — 3017F COLORECTAL CA SCREEN DOC REV: CPT | Performed by: INTERNAL MEDICINE

## 2021-11-09 PROCEDURE — G8536 NO DOC ELDER MAL SCRN: HCPCS | Performed by: INTERNAL MEDICINE

## 2021-11-09 NOTE — PROGRESS NOTES
Chief Complaint   Patient presents with    Physical     Visit Vitals  /84 (BP 1 Location: Left arm, BP Patient Position: Sitting, BP Cuff Size: Adult)   Pulse 62   Temp 98 °F (36.7 °C) (Oral)   Resp 18   Ht 5' 10\" (1.778 m)   Wt 196 lb 12.8 oz (89.3 kg)   SpO2 97%   BMI 28.24 kg/m²     1. Have you been to the ER, urgent care clinic since your last visit? Hospitalized since your last visit? No    2. Have you seen or consulted any other health care providers outside of the [address] [place] [address] since your last visit? Include any pap smears or colon screening.  No

## 2021-11-09 NOTE — PROGRESS NOTES
Stone Cannon is a 78 y.o. male and presents with Follow-up and Hypertension  Patient presents for general check we reviewed last labs from November of last year yielding a white count of 7600 hemoglobin 15.6 glucose 84 creatinine 1.04 sodium 144 potassium 4.3 calcium 9.2 total cholesterol 194 HDL 89 LDL 87 PSA 2.5 TSH 3.4 he continues on amlodipine 5 mg a day and naproxen over-the-counter and does well for occasional DJD pains he may retire from [employer] this year is fairly active still and is able to have good range of motion of the upper extremities status post his dominant right arm shoulder surgery by Dr. Alicia Faulkner. Review of systems is not remarkable he was agreeable for the office phlebotomist draw blood flow studies below he has received the [place] coronavirus vaccine x2 and is dissipated of booster blood pressure 130/80 left arm resting 128/78 right arm resting he has lost 2 pounds volitionally he was given Hemoccult cards to check for bleeding but he did have a year or so ago Cologuard which was negative he was agreeable for the office phlebotomist to draw blood for those studies below history of old systolic murmur and essential hypertension  -History of obstructive sleep apnea whereupon he wears a CPAP mask. He has been troubled lately with waking up during the night with arm pain when he lies on his left side he notes arm pain on the right arm below the elbow and the hand he gets up straightens out his arm and it goes away when he lies on his left arm or the left side his right arm which is flexed also develops numbness paresthesias which resolved with extension the arm no cervical neck pain he has limited range of motion of the neck due to arthritis in the spine.   He was agreeable to check labs first he may need imaging studies         Review of Systems  Constitutional: negative for fevers, chills, anorexia, weight loss and fatigue,no insomnia  Eyes:   negative for visual disturbance and irritation, eye discharge, eye pain. no eye redness. ENT:   negative for tinnitus, sore throat, nasal congestion, ear pain, hoarseness, hearing loss.,no snoring. Respiratory:  negative for cough, hemoptysis, shortness of breath, wheezing,  CV:   negative for chest pain, palpitations, lower extremity edema, shortness of breath while sitting, walking or at night  GI:   negative for nausea, vomiting, diarrhea, abdominal pain,melena,constipation. Endo:               negative for polyuria, polydipsia, polyphagia, cold or heat intolerance,hair loss. Genitourinary: negative for frequency, dysuria and hematuria,urethral discharge,nocturia.straining while urination,urinary incontinence. Integument:  negative for rash and pruritus  Hematologic:  negative for easy bruising and gum/nose bleeding, enlarged nodes  Musculoskel: negative for myalgias, arthralgias, back pain, muscle weakness, joint pain, h/o fall,cramps,calf pain. Neurological:  negative for headaches, dizziness, vertigo, memory problems, gait and seizures loss of consciousness,no ataxia.   Behavl/Psych: negative for feelings of anxiety, depression, mood changes ,sadness    Past Medical History:   Diagnosis Date    Acute sinusitis 11/4/2020    Benign essential hypertension 11/4/2020    Bradycardia 11/4/2020    Disorder of bursae of shoulder region 11/4/2020    Hyperlipidemia 11/4/2020    Inflammation of sacroiliac joint ([address] 11/4/2020    Knee pain 11/4/2020    Nocturia 11/4/2020    Obstructive sleep apnea 11/4/2020    Osteoarthritis of shoulder 11/4/2020    URI (upper respiratory infection) 11/4/2020     Past Surgical History:   Procedure Laterality Date    HX APPENDECTOMY  1968    HX COLONOSCOPY  2007    HX HERNIA REPAIR  2014    HX ORTHOPAEDIC  2017    total right shoulder replacement     Social History     Socioeconomic History    Marital status:    Tobacco Use    Smoking status: Never Smoker    Smokeless tobacco: Never Used   Vaping Use    Vaping Use: Never used   Substance and Sexual Activity    Alcohol use: Yes     Comment: moderate     Family History   Problem Relation Age of Onset    Hypertension Father      Current Outpatient Medications   Medication Sig Dispense Refill    amLODIPine (NORVASC) 5 mg tablet TAKE 1 TABLET BY MOUTH TWICE DAILY 180 Tablet 1    naproxen sodium (NAPROSYN) 220 mg tablet Take 220 mg by mouth two (2) times daily (with meals). Allergies   Allergen Reactions    Ace Inhibitors Cough       Objective:  Visit Vitals  /84 (BP 1 Location: Left arm, BP Patient Position: Sitting, BP Cuff Size: Adult)   Pulse 62   Temp 98 °F (36.7 °C) (Oral)   Resp 18   Ht 5' 10\" (1.778 m)   Wt 196 lb 12.8 oz (89.3 kg)   SpO2 97%   BMI 28.24 kg/m²       Physical Exam:   Constitutional: General Appearance: healthy-appearing and obese. Level of Distress: NAD. Ambulation: ambulating normally. Psychiatric: Mental Status: normal mood and affect and active and alert. Orientation: to time, place, and person. no agitation. ,normal eye contact. normal insight  Head: Head: normocephalic and atraumatic. Eyes: Pupils: PERRLA. Sclerae: non-icteric. ENMT: No lesions on external ear, no hearing loss. No lesions on external nose, sinus tenderness, or nasal discharge. Lips, Teeth, and no mouth or lip ulcers   Neck: . Thyroid: no enlargement or nodules and non-tender. Lungs: Respiratory effort: no dyspnea. Auscultation: no wheezing, rales/crackles, or rhonchi and breath sounds normal and good air movement. Cardiovascular:. ; history of murmur murmurs, rubs, or gallops; and RRR. Neck vessels: no carotid bruits. Pulses including femoral / pedal: normal throughout. History of murmur  Abdomen: Bowel Sounds: Hemoccult negative brown stool x3  Musculoskeletal[de-identified] Extremities: no edema or varicosities. Calf tenderness. DJD changes  Neurologic: Gait and Station: normal gait and station. Motor Strength normal right and left.  Sensory and cerebellar intact. Skin: Inspection and palpation: no rash, lesions, or ulcer. Results for orders placed or performed in visit on 11/09/21   CBC WITH AUTOMATED DIFF   Result Value Ref Range    WBC 6.4 3.4 - 10.8 x10E3/uL    RBC 5.40 4. 14 - 5.80 x10E6/uL    HGB 16.0 13.0 - 17.7 g/dL    HCT 47.6 37.5 - 51.0 %    MCV 88 79 - 97 fL    MCH 29.6 26.6 - 33.0 pg    MCHC 33.6 31.5 - 35.7 g/dL    RDW 12.3 11.6 - 15.4 %    PLATELET 947 890 - 668 x10E3/uL    NEUTROPHILS 53 Not Estab. %    Lymphocytes 28 Not Estab. %    MONOCYTES 13 Not Estab. %    EOSINOPHILS 4 Not Estab. %    BASOPHILS 1 Not Estab. %    ABS. NEUTROPHILS 3.4 1.4 - 7.0 x10E3/uL    Abs Lymphocytes 1.8 0.7 - 3.1 x10E3/uL    ABS. MONOCYTES 0.8 0.1 - 0.9 x10E3/uL    ABS. EOSINOPHILS 0.3 0.0 - 0.4 x10E3/uL    ABS. BASOPHILS 0.0 0.0 - 0.2 x10E3/uL    IMMATURE GRANULOCYTES 1 Not Estab. %    ABS. IMM. GRANS. 0.0 0.0 - 0.1 [id number]   METABOLIC PANEL, COMPREHENSIVE   Result Value Ref Range    Glucose 66 65 - 99 mg/dL    BUN 16 8 - 27 mg/dL    Creatinine 0.90 0.76 - 1.27 mg/dL    GFR est non-AA 87 >59 mL/min/1.73    GFR est  >59 mL/min/1.73    BUN/Creatinine ratio 18 10 - 24    Sodium 142 134 - 144 mmol/L    Potassium 4.4 3.5 - 5.2 mmol/L    Chloride 102 96 - 106 mmol/L    CO2 23 20 - 29 mmol/L    Calcium 9.7 8.6 - 10.2 mg/dL    Protein, total 6.7 6.0 - 8.5 g/dL    Albumin 4.3 3.8 - 4.8 g/dL    GLOBULIN, TOTAL 2.4 1.5 - 4.5 g/dL    A-G Ratio 1.8 1.2 - 2.2    Bilirubin, total 0.6 0.0 - 1.2 mg/dL    Alk.  phosphatase 60 44 - 121 IU/L    AST (SGOT) 20 0 - 40 IU/L    ALT (SGPT) 22 0 - 44 IU/L   PSA W/ REFLX FREE PSA   Result Value Ref Range    Prostate Specific Ag 2.8 0.0 - 4.0 ng/mL    Reflex Criteria Comment    TSH 3RD GENERATION   Result Value Ref Range    TSH 2.350 0.450 - 4.500 uIU/mL   LIPID PANEL   Result Value Ref Range    Cholesterol, total 191 100 - 199 mg/dL    Triglyceride 71 0 - 149 mg/dL    HDL Cholesterol 77 >39 mg/dL    VLDL, calculated 13 5 - 40 mg/dL    LDL, calculated 101 (H) 0 - 99 mg/dL   T4, FREE   Result Value Ref Range    T4, Free 1.30 0.82 - 1.77 ng/dL   RADHA, DIRECT, W/REFLEX   Result Value Ref Range    Antinuclear Antibodies Direct Negative Negative   SED RATE (ESR)   Result Value Ref Range    Sed rate (ESR) 14 0 - 30 mm/hr       Assessment/Plan:    ICD-10-CM ICD-9-CM    1. Obstructive sleep apnea  G47.33 327.23 CBC WITH AUTOMATED DIFF      METABOLIC PANEL, COMPREHENSIVE   2. Benign essential hypertension  I10 401.1 CBC WITH AUTOMATED DIFF      METABOLIC PANEL, COMPREHENSIVE   3. Primary osteoarthritis of right shoulder  M19.011 715.11    4. Pure hypercholesterolemia  E78.00 272.0 TSH 3RD GENERATION      T4, FREE   5. Impotence  N52.9 607.84 LIPID PANEL   6. Benign prostatic hyperplasia, unspecified whether lower urinary tract symptoms present  N40.0 600.00 PSA W/ REFLX FREE PSA   7. Sensory neuropathy  G62.9 356.9 SED RATE (ESR)      T4, FREE      SED RATE (ESR)      RADHA, DIRECT, W/REFLEX   8. Other fatigue  R53.83 780.79 SED RATE (ESR)      T4, FREE      SED RATE (ESR)      RADHA, DIRECT, W/REFLEX   9. Arm paresthesia, right  R20.2 782.0    10. Arm paresthesia, left  R20.2 782.0    11. Systolic murmur  L14.9 378.2      Orders Placed This Encounter    CBC WITH AUTOMATED DIFF    METABOLIC PANEL, COMPREHENSIVE    PSA W/ REFLX FREE PSA    TSH 3RD GENERATION    LIPID PANEL    SED RATE (ESR)    T4, FREE    SED RATE (ESR)    RADHA, DIRECT, W/REFLEX    SED RATE (ESR)       increase physical activity, routine labs ordered, call if any problems    There are no Patient Instructions on file for this visit. Follow-up and Dispositions    · Return in about 6 months (around 5/9/2022).

## 2021-11-10 LAB
ALBUMIN SERPL-MCNC: 4.3 G/DL (ref 3.8–4.8)
ALBUMIN/GLOB SERPL: 1.8 {RATIO} (ref 1.2–2.2)
ALP SERPL-CCNC: 60 IU/L (ref 44–121)
ALT SERPL-CCNC: 22 IU/L (ref 0–44)
ANA SER QL: NEGATIVE
AST SERPL-CCNC: 20 IU/L (ref 0–40)
BASOPHILS # BLD AUTO: 0 X10E3/UL (ref 0–0.2)
BASOPHILS NFR BLD AUTO: 1 %
BILIRUB SERPL-MCNC: 0.6 MG/DL (ref 0–1.2)
BUN SERPL-MCNC: 16 MG/DL (ref 8–27)
BUN/CREAT SERPL: 18 (ref 10–24)
CALCIUM SERPL-MCNC: 9.7 MG/DL (ref 8.6–10.2)
CHLORIDE SERPL-SCNC: 102 MMOL/L (ref 96–106)
CHOLEST SERPL-MCNC: 191 MG/DL (ref 100–199)
CO2 SERPL-SCNC: 23 MMOL/L (ref 20–29)
CREAT SERPL-MCNC: 0.9 MG/DL (ref 0.76–1.27)
EOSINOPHIL # BLD AUTO: 0.3 X10E3/UL (ref 0–0.4)
EOSINOPHIL NFR BLD AUTO: 4 %
ERYTHROCYTE [DISTWIDTH] IN BLOOD BY AUTOMATED COUNT: 12.3 % (ref 11.6–15.4)
ERYTHROCYTE [SEDIMENTATION RATE] IN BLOOD BY WESTERGREN METHOD: 14 MM/HR (ref 0–30)
GLOBULIN SER CALC-MCNC: 2.4 G/DL (ref 1.5–4.5)
GLUCOSE SERPL-MCNC: 66 MG/DL (ref 65–99)
HCT VFR BLD AUTO: 47.6 % (ref 37.5–51)
HDLC SERPL-MCNC: 77 MG/DL
HGB BLD-MCNC: 16 G/DL (ref 13–17.7)
IMM GRANULOCYTES # BLD AUTO: 0 X10E3/UL (ref 0–0.1)
IMM GRANULOCYTES NFR BLD AUTO: 1 %
LDLC SERPL CALC-MCNC: 101 MG/DL (ref 0–99)
LYMPHOCYTES # BLD AUTO: 1.8 X10E3/UL (ref 0.7–3.1)
LYMPHOCYTES NFR BLD AUTO: 28 %
MCH RBC QN AUTO: 29.6 PG (ref 26.6–33)
MCHC RBC AUTO-ENTMCNC: 33.6 G/DL (ref 31.5–35.7)
MCV RBC AUTO: 88 FL (ref 79–97)
MONOCYTES # BLD AUTO: 0.8 X10E3/UL (ref 0.1–0.9)
MONOCYTES NFR BLD AUTO: 13 %
NEUTROPHILS # BLD AUTO: 3.4 X10E3/UL (ref 1.4–7)
NEUTROPHILS NFR BLD AUTO: 53 %
PLATELET # BLD AUTO: 292 X10E3/UL (ref 150–450)
POTASSIUM SERPL-SCNC: 4.4 MMOL/L (ref 3.5–5.2)
PROT SERPL-MCNC: 6.7 G/DL (ref 6–8.5)
PSA SERPL-MCNC: 2.8 NG/ML (ref 0–4)
RBC # BLD AUTO: 5.4 X10E6/UL (ref 4.14–5.8)
REFLEX CRITERIA: NORMAL
SODIUM SERPL-SCNC: 142 MMOL/L (ref 134–144)
T4 FREE SERPL-MCNC: 1.3 NG/DL (ref 0.82–1.77)
TRIGL SERPL-MCNC: 71 MG/DL (ref 0–149)
TSH SERPL DL<=0.005 MIU/L-ACNC: 2.35 UIU/ML (ref 0.45–4.5)
VLDLC SERPL CALC-MCNC: 13 MG/DL (ref 5–40)
WBC # BLD AUTO: 6.4 X10E3/UL (ref 3.4–10.8)

## 2021-11-11 NOTE — PROGRESS NOTES
Notify patient complete blood count hemoglobin liver enzymes kidney function test electrolytes blood sugar PSA for prostate thyroid cholesterol all normal in addition RADHA for lupus is negative sed rate 14 source less likely be a rheumatological problem with his arms becoming numb imaging studies is the best way to go if symptoms persist or if he wants to pursue it now and I can set up the x-rays

## 2022-03-18 PROBLEM — G47.33 OBSTRUCTIVE SLEEP APNEA: Status: ACTIVE | Noted: 2020-11-04

## 2022-03-19 PROBLEM — J06.9 URI (UPPER RESPIRATORY INFECTION): Status: ACTIVE | Noted: 2020-11-04

## 2022-03-19 PROBLEM — J01.90 ACUTE SINUSITIS: Status: ACTIVE | Noted: 2020-11-04

## 2022-03-19 PROBLEM — N52.9 IMPOTENCE: Status: ACTIVE | Noted: 2020-11-05

## 2022-03-19 PROBLEM — M25.569 KNEE PAIN: Status: ACTIVE | Noted: 2020-11-04

## 2022-03-19 PROBLEM — R35.1 NOCTURIA: Status: ACTIVE | Noted: 2020-11-04

## 2022-03-19 PROBLEM — M19.019 OSTEOARTHRITIS OF SHOULDER: Status: ACTIVE | Noted: 2020-11-04

## 2022-03-19 PROBLEM — M46.1 INFLAMMATION OF SACROILIAC JOINT (HCC): Status: ACTIVE | Noted: 2020-11-04

## 2022-03-19 PROBLEM — M71.9 DISORDER OF BURSAE OF SHOULDER REGION: Status: ACTIVE | Noted: 2020-11-04

## 2022-03-19 PROBLEM — R00.1 BRADYCARDIA: Status: ACTIVE | Noted: 2020-11-04

## 2022-03-20 PROBLEM — I10 BENIGN ESSENTIAL HYPERTENSION: Status: ACTIVE | Noted: 2020-11-04

## 2022-03-20 PROBLEM — E78.5 HYPERLIPIDEMIA: Status: ACTIVE | Noted: 2020-11-04

## 2022-04-28 RX ORDER — AMLODIPINE BESYLATE 5 MG/1
TABLET ORAL
Qty: 180 TABLET | Refills: 0 | Status: SHIPPED | OUTPATIENT
Start: 2022-04-28 | End: 2022-07-27

## 2022-05-01 PROBLEM — R35.1 NOCTURIA: Status: RESOLVED | Noted: 2020-11-04 | Resolved: 2022-05-01

## 2022-05-01 PROBLEM — I10 PRIMARY HYPERTENSION: Status: ACTIVE | Noted: 2020-11-04

## 2022-05-01 PROBLEM — J01.90 ACUTE SINUSITIS: Status: RESOLVED | Noted: 2020-11-04 | Resolved: 2022-05-01

## 2022-05-01 PROBLEM — R00.1 BRADYCARDIA: Status: RESOLVED | Noted: 2020-11-04 | Resolved: 2022-05-01

## 2022-05-01 PROBLEM — J06.9 URI (UPPER RESPIRATORY INFECTION): Status: RESOLVED | Noted: 2020-11-04 | Resolved: 2022-05-01

## 2022-05-01 PROBLEM — Z11.59 NEED FOR HEPATITIS C SCREENING TEST: Status: ACTIVE | Noted: 2022-05-01

## 2022-05-01 PROBLEM — M46.1 INFLAMMATION OF SACROILIAC JOINT (HCC): Status: RESOLVED | Noted: 2020-11-04 | Resolved: 2022-05-01

## 2022-05-01 PROBLEM — M25.569 KNEE PAIN: Status: RESOLVED | Noted: 2020-11-04 | Resolved: 2022-05-01

## 2022-05-18 ENCOUNTER — OFFICE VISIT (OUTPATIENT)
Dept: INTERNAL MEDICINE CLINIC | Age: 72
End: 2022-05-18
Payer: MEDICARE

## 2022-05-18 VITALS
BODY MASS INDEX: 28.29 KG/M2 | OXYGEN SATURATION: 96 % | HEART RATE: 72 BPM | TEMPERATURE: 97 F | RESPIRATION RATE: 20 BRPM | SYSTOLIC BLOOD PRESSURE: 124 MMHG | WEIGHT: 197.6 LBS | DIASTOLIC BLOOD PRESSURE: 80 MMHG | HEIGHT: 70 IN

## 2022-05-18 DIAGNOSIS — Z11.59 NEED FOR HEPATITIS C SCREENING TEST: ICD-10-CM

## 2022-05-18 DIAGNOSIS — G47.33 OBSTRUCTIVE SLEEP APNEA: ICD-10-CM

## 2022-05-18 DIAGNOSIS — I10 PRIMARY HYPERTENSION: ICD-10-CM

## 2022-05-18 DIAGNOSIS — N52.9 IMPOTENCE: ICD-10-CM

## 2022-05-18 DIAGNOSIS — Z96.611 HISTORY OF TOTAL SHOULDER REPLACEMENT, RIGHT: ICD-10-CM

## 2022-05-18 PROCEDURE — G8427 DOCREV CUR MEDS BY ELIG CLIN: HCPCS | Performed by: INTERNAL MEDICINE

## 2022-05-18 PROCEDURE — G8510 SCR DEP NEG, NO PLAN REQD: HCPCS | Performed by: INTERNAL MEDICINE

## 2022-05-18 PROCEDURE — G8536 NO DOC ELDER MAL SCRN: HCPCS | Performed by: INTERNAL MEDICINE

## 2022-05-18 PROCEDURE — G8419 CALC BMI OUT NRM PARAM NOF/U: HCPCS | Performed by: INTERNAL MEDICINE

## 2022-05-18 PROCEDURE — G8752 SYS BP LESS 140: HCPCS | Performed by: INTERNAL MEDICINE

## 2022-05-18 PROCEDURE — 99214 OFFICE O/P EST MOD 30 MIN: CPT | Performed by: INTERNAL MEDICINE

## 2022-05-18 PROCEDURE — 1101F PT FALLS ASSESS-DOCD LE1/YR: CPT | Performed by: INTERNAL MEDICINE

## 2022-05-18 PROCEDURE — 3017F COLORECTAL CA SCREEN DOC REV: CPT | Performed by: INTERNAL MEDICINE

## 2022-05-18 PROCEDURE — G8754 DIAS BP LESS 90: HCPCS | Performed by: INTERNAL MEDICINE

## 2022-05-18 RX ORDER — SILDENAFIL 100 MG/1
25 TABLET, FILM COATED ORAL
COMMUNITY

## 2022-05-18 NOTE — PROGRESS NOTES
Carrie Cottrell is a 73 y.o. male and presents with Follow Up Chronic Condition and Hypertension    Mr. Gideon Steven came to establish with me he used to see previous PCP Dr. Hattie Chu. He is pleasant personality. His blood pressure is controlled after resting is very compliant for medicines. He has history of right shoulder replacement in 2017 he has remarkable, difference and improvement sometimes when he, exerts a lot with his shoulder for doing work he gets soreness in the right upper arm he takes, OTC naproxen daily he was, prescribed ibuprofen 800 mg by Dr. Abby Ibarra who did replacement of right shoulder and he, was told to have appointment before giving refills, he had some questions I answered to my best ability. He takes sildenafil for his erectile dysfunction. He is due for labs last blood work was done in November 2021 I reviewed the labs results done in the past in the epic and he had undergone Cologuard. Review of Systems    Review of Systems   Constitutional: Negative. HENT: Negative for sinus pain. Eyes: Negative for blurred vision. Respiratory: Negative for cough and wheezing. Cardiovascular: Negative. Gastrointestinal: Negative. Genitourinary: Negative for dysuria and flank pain. Musculoskeletal: Negative. H/o sometimes rt upper arm soreness ,.and h/ rt shoulder replacement. Neurological: Negative for dizziness, tingling, tremors, sensory change and headaches. Endo/Heme/Allergies: Negative for polydipsia. Psychiatric/Behavioral: Negative.          Past Medical History:   Diagnosis Date    Acute sinusitis 11/4/2020    Benign essential hypertension 11/4/2020    Bradycardia 11/4/2020    Disorder of bursae of shoulder region 11/4/2020    Hyperlipidemia 11/4/2020    Inflammation of sacroiliac joint ([address] 11/4/2020    Knee pain 11/4/2020    Nocturia 11/4/2020    Obstructive sleep apnea 11/4/2020    Osteoarthritis of shoulder 11/4/2020    URI (upper respiratory infection) 11/4/2020     Past Surgical History:   Procedure Laterality Date    HX APPENDECTOMY  1968    HX COLONOSCOPY  2007    HX HERNIA REPAIR  2014    HX ORTHOPAEDIC  2017    total right shoulder replacement     Social History     Socioeconomic History    Marital status:    Tobacco Use    Smoking status: Never Smoker    Smokeless tobacco: Never Used   Vaping Use    Vaping Use: Never used   Substance and Sexual Activity    Alcohol use: Yes     Comment: moderate     Social Determinants of Health     Financial Resource Strain: Low Risk     Difficulty of Paying Living Expenses: Not hard at all   Food Insecurity: No Food Insecurity    Worried About Running Out of Food in the Last Year: Never true    Coleman of Food in the Last Year: Never true     Family History   Problem Relation Age of Onset    Hypertension Father      Current Outpatient Medications   Medication Sig Dispense Refill    sildenafil citrate (VIAGRA) 100 mg tablet Take 25 mg by mouth daily as needed.  amLODIPine (NORVASC) 5 mg tablet TAKE 1 TABLET BY MOUTH TWICE DAILY 180 Tablet 0    naproxen sodium (NAPROSYN) 220 mg tablet Take 220 mg by mouth two (2) times daily (with meals). Allergies   Allergen Reactions    Ace Inhibitors Cough       Objective:  Visit Vitals  /80 (BP 1 Location: Right upper arm)   Pulse 72   Temp 97 °F (36.1 °C) (Temporal)   Resp 20   Ht 5' 10\" (1.778 m)   Wt 197 lb 9.6 oz (89.6 kg)   SpO2 96%   BMI 28.35 kg/m²       Physical Exam:   Constitutional: General Appearance: Pleasant. Level of Distress: NAD. Psychiatric: Mental Status: normal mood and affect Orientation: to time, place, and person. ,normal eye contact. Head: Head: normocephalic and atraumatic. Eyes: Pupils: PERRLA. Sclerae: non-icteric. Neck: Neck: supple, trachea midline, and no masses. Lymph Nodes: no cervical LAD. Thyroid: no enlargement or nodules and non-tender. Lungs: Respiratory effort: no dyspnea. Auscultation: no wheezing, rales/crackles, or rhonchi and breath sounds normal and good air movement. Cardiovascular: Apical Impulse: not displaced. Heart Auscultation: normal S1 and S2; no murmurs, rubs, or gallops; and RRR. Neck vessels: no carotid bruits. Pulses including femoral / pedal: normal throughout. Abdomen: Bowel Sounds: normal. Inspection and Palpation: no tenderness, guarding, or masses and soft and non-distended. Liver: non-tender and no hepatomegaly. Spleen: non-tender and no splenomegaly. Musculoskeletal[de-identified] Extremities: no edema,no varicosities. No Calf tenderness. Range of movement normal at right shoulder joint no localized redness swelling tenderness previous healthy shoulder are on the right shoulder  Neurologic: Gait and Station: normal gait and station. Motor Strength normal right and left. Skin: Inspection and palpation: no rash, lesions, or ulcer. Results for orders placed or performed in visit on 11/09/21   CBC WITH AUTOMATED DIFF   Result Value Ref Range    WBC 6.4 3.4 - 10.8 x10E3/uL    RBC 5.40 4. 14 - 5.80 x10E6/uL    HGB 16.0 13.0 - 17.7 g/dL    HCT 47.6 37.5 - 51.0 %    MCV 88 79 - 97 fL    MCH 29.6 26.6 - 33.0 pg    MCHC 33.6 31.5 - 35.7 g/dL    RDW 12.3 11.6 - 15.4 %    PLATELET 001 579 - 000 x10E3/uL    NEUTROPHILS 53 Not Estab. %    Lymphocytes 28 Not Estab. %    MONOCYTES 13 Not Estab. %    EOSINOPHILS 4 Not Estab. %    BASOPHILS 1 Not Estab. %    ABS. NEUTROPHILS 3.4 1.4 - 7.0 x10E3/uL    Abs Lymphocytes 1.8 0.7 - 3.1 x10E3/uL    ABS. MONOCYTES 0.8 0.1 - 0.9 x10E3/uL    ABS. EOSINOPHILS 0.3 0.0 - 0.4 x10E3/uL    ABS. BASOPHILS 0.0 0.0 - 0.2 x10E3/uL    IMMATURE GRANULOCYTES 1 Not Estab. %    ABS. IMM.  GRANS. 0.0 0.0 - 0.1 Z27W4/EE   METABOLIC PANEL, COMPREHENSIVE   Result Value Ref Range    Glucose 66 65 - 99 mg/dL    BUN 16 8 - 27 mg/dL    Creatinine 0.90 0.76 - 1.27 mg/dL    GFR est non-AA 87 >59 mL/min/1.73    GFR est  >59 mL/min/1.73    BUN/Creatinine ratio 18 10 - 24    Sodium 142 134 - 144 mmol/L    Potassium 4.4 3.5 - 5.2 mmol/L    Chloride 102 96 - 106 mmol/L    CO2 23 20 - 29 mmol/L    Calcium 9.7 8.6 - 10.2 mg/dL    Protein, total 6.7 6.0 - 8.5 g/dL    Albumin 4.3 3.8 - 4.8 g/dL    GLOBULIN, TOTAL 2.4 1.5 - 4.5 g/dL    A-G Ratio 1.8 1.2 - 2.2    Bilirubin, total 0.6 0.0 - 1.2 mg/dL    Alk. phosphatase 60 44 - 121 IU/L    AST (SGOT) 20 0 - 40 IU/L    ALT (SGPT) 22 0 - 44 IU/L   PSA W/ REFLX FREE PSA   Result Value Ref Range    Prostate Specific Ag 2.8 0.0 - 4.0 ng/mL    Reflex Criteria Comment    TSH 3RD GENERATION   Result Value Ref Range    TSH 2.350 0.450 - 4.500 uIU/mL   LIPID PANEL   Result Value Ref Range    Cholesterol, total 191 100 - 199 mg/dL    Triglyceride 71 0 - 149 mg/dL    HDL Cholesterol 77 >39 mg/dL    VLDL, calculated 13 5 - 40 mg/dL    LDL, calculated 101 (H) 0 - 99 mg/dL   T4, FREE   Result Value Ref Range    T4, Free 1.30 0.82 - 1.77 ng/dL   RADHA, DIRECT, W/REFLEX   Result Value Ref Range    Antinuclear Antibodies Direct Negative Negative   SED RATE (ESR)   Result Value Ref Range    Sed rate (ESR) 14 0 - 30 mm/hr       Assessment/Plan:      ICD-10-CM ICD-9-CM    1. Primary hypertension  I10 401.9 CBC WITH AUTOMATED DIFF      METABOLIC PANEL, COMPREHENSIVE   2. Obstructive sleep apnea  G47.33 327.23    3. Need for hepatitis C screening test  Z11.59 V73.89 HEPATITIS C AB   4. History of total shoulder replacement, right  Z96.611 V43.61    5. Impotence  N52.9 607.84      Orders Placed This Encounter    CBC WITH AUTOMATED DIFF    METABOLIC PANEL, COMPREHENSIVE    HEPATITIS C AB    sildenafil citrate (VIAGRA) 100 mg tablet     Sig: Take 25 mg by mouth daily as needed. Hypertension well controlled after resting. Continue heart healthy diet with healthy lifestyle and he is doing a lot of exercise recommended not to do over doing exercise. Especially with right shoulder.   Avoid heavy lifting pushing pulling just continue muscle strain, getting exercise and take opinion from Dr. Surekha Roblero. Continue amlodipine 5 mg once a day. Recommended not to take naproxen on a daily basis not to take any NSAIDs on a daily basis which can have long-term side effects on vital target organs like stomach, kidney, heart and blood pressure. Erectile dysfunction he has seen commercial advertisement he has been placed, Sildenafil 100 mg as needed by Dr. Delmi Messer that I will continue, no history of prostate problems. Recommended that he can take Tylenol 500 mg for sometimes having soreness if he has no improvement he can try 650 mg Tylenol as needed once a day he does, and drink alcohol in excess sometimes he drinks 2 drinks mixed drinks and sometimes beer recommended, not to take Tylenol along with alcohol. Follow-up in 6 months for Medicare wellness visit and regular follow-up. Labs ordered answered all the questions. He is doing a lot of exercise recommended that he has to keep in mind about his replacement of shoulder that he had undergone he is happy with the, outcome, of the replacement surgery. follow low fat diet, follow low salt diet, continue present plan, routine labs ordered, call if any problems    There are no Patient Instructions on file for this visit. Follow-up and Dispositions    · Return in about 6 months (around 11/18/2022) for follow up for chronic conditionPLUS MEDICARE WELLNESS.

## 2022-05-18 NOTE — PROGRESS NOTES
1. \"Have you been to the ER, urgent care clinic since your last visit? Hospitalized since your last visit? \" No    2. \"Have you seen or consulted any other health care providers outside of the [address] [place] [address] since your last visit? \" No     3. For patients aged 39-70: Has the patient had a colonoscopy / FIT/ Cologuard? Yes - Care Gap present. Most recent result on file      If the patient is female:    4. For patients aged 41-77: Has the patient had a mammogram within the past 2 years? NA - based on age or sex      11. For patients aged 21-65: Has the patient had a pap smear?  NA - based on age or sex     Visit Vitals  BP (!) 147/72 (BP 1 Location: Left arm)   Pulse 75   Temp 97 °F (36.1 °C) (Temporal)   Resp 20   Ht 5' 10\" (1.778 m)   Wt 197 lb 9.6 oz (89.6 kg)   SpO2 96%   BMI 28.35 kg/m²       Chief Complaint   Patient presents with    Follow Up Chronic Condition    Hypertension

## 2022-05-31 PROBLEM — Z11.59 NEED FOR HEPATITIS C SCREENING TEST: Status: RESOLVED | Noted: 2022-05-01 | Resolved: 2022-05-31

## 2022-06-30 LAB
ALBUMIN SERPL-MCNC: 4.2 G/DL (ref 3.8–4.8)
ALBUMIN/GLOB SERPL: 1.5 {RATIO} (ref 1.2–2.2)
ALP SERPL-CCNC: 62 IU/L (ref 44–121)
ALT SERPL-CCNC: 19 IU/L (ref 0–44)
AST SERPL-CCNC: 17 IU/L (ref 0–40)
BASOPHILS # BLD AUTO: 0 X10E3/UL (ref 0–0.2)
BASOPHILS NFR BLD AUTO: 0 %
BILIRUB SERPL-MCNC: 0.6 MG/DL (ref 0–1.2)
BUN SERPL-MCNC: 20 MG/DL (ref 8–27)
BUN/CREAT SERPL: 20 (ref 10–24)
CALCIUM SERPL-MCNC: 9.3 MG/DL (ref 8.6–10.2)
CHLORIDE SERPL-SCNC: 101 MMOL/L (ref 96–106)
CO2 SERPL-SCNC: 22 MMOL/L (ref 20–29)
CREAT SERPL-MCNC: 0.99 MG/DL (ref 0.76–1.27)
EGFR: 82 ML/MIN/1.73
EOSINOPHIL # BLD AUTO: 0.2 X10E3/UL (ref 0–0.4)
EOSINOPHIL NFR BLD AUTO: 3 %
ERYTHROCYTE [DISTWIDTH] IN BLOOD BY AUTOMATED COUNT: 12.2 % (ref 11.6–15.4)
GLOBULIN SER CALC-MCNC: 2.8 G/DL (ref 1.5–4.5)
GLUCOSE SERPL-MCNC: 85 MG/DL (ref 65–99)
HCT VFR BLD AUTO: 47.5 % (ref 37.5–51)
HCV AB S/CO SERPL IA: <0.1 S/CO RATIO (ref 0–0.9)
HGB BLD-MCNC: 15.2 G/DL (ref 13–17.7)
IMM GRANULOCYTES # BLD AUTO: 0.1 X10E3/UL (ref 0–0.1)
IMM GRANULOCYTES NFR BLD AUTO: 1 %
LYMPHOCYTES # BLD AUTO: 1.7 X10E3/UL (ref 0.7–3.1)
LYMPHOCYTES NFR BLD AUTO: 25 %
MCH RBC QN AUTO: 28.6 PG (ref 26.6–33)
MCHC RBC AUTO-ENTMCNC: 32 G/DL (ref 31.5–35.7)
MCV RBC AUTO: 90 FL (ref 79–97)
MONOCYTES # BLD AUTO: 0.8 X10E3/UL (ref 0.1–0.9)
MONOCYTES NFR BLD AUTO: 11 %
NEUTROPHILS # BLD AUTO: 4.1 X10E3/UL (ref 1.4–7)
NEUTROPHILS NFR BLD AUTO: 60 %
PLATELET # BLD AUTO: 266 X10E3/UL (ref 150–450)
POTASSIUM SERPL-SCNC: 4.8 MMOL/L (ref 3.5–5.2)
PROT SERPL-MCNC: 7 G/DL (ref 6–8.5)
RBC # BLD AUTO: 5.31 X10E6/UL (ref 4.14–5.8)
SODIUM SERPL-SCNC: 141 MMOL/L (ref 134–144)
WBC # BLD AUTO: 6.8 X10E3/UL (ref 3.4–10.8)

## 2022-06-30 NOTE — PROGRESS NOTES
Please call  Ryan Eason that complete blood count is normal including hemoglobin red blood cell count platelets. Blood sugar kidney function potassium electrolytes liver function within normal range. Screening for hep C negative.

## 2022-10-06 ENCOUNTER — TELEPHONE (OUTPATIENT)
Dept: INTERNAL MEDICINE CLINIC | Age: 72
End: 2022-10-06

## 2022-10-06 NOTE — TELEPHONE ENCOUNTER
----- Message from [address] [place] [address] sent at 10/5/2022  2:41 PM EDT -----  Subject: Appointment Request    Reason for Call: Established Patient Appointment needed: Routine Existing   Condition Follow Up    QUESTIONS    Reason for appointment request? No appointments available during search     Additional Information for Provider? pt needs to rs appt [facility] for 11/11   going out of town leaving on 11/9/22 please call asap to rs   ---------------------------------------------------------------------------  --------------  [address] [facility] [facility]  [phone]; OK to leave message on voicemail,Do not leave any message,   patient will call back for answer  ---------------------------------------------------------------------------  --------------  SCRIPT ANSWERS  COVID Screen: Evelyn Krause

## 2022-12-23 ENCOUNTER — TRANSCRIBE ORDER (OUTPATIENT)
Dept: SCHEDULING | Age: 72
End: 2022-12-23

## 2022-12-23 DIAGNOSIS — Z12.31 SCREENING MAMMOGRAM FOR HIGH-RISK PATIENT: Primary | ICD-10-CM

## 2023-01-23 RX ORDER — AMLODIPINE BESYLATE 5 MG/1
TABLET ORAL
Qty: 180 TABLET | Refills: 1 | Status: SHIPPED | OUTPATIENT
Start: 2023-01-23

## 2023-05-18 RX ORDER — SILDENAFIL 100 MG/1
25 TABLET, FILM COATED ORAL DAILY PRN
COMMUNITY

## 2023-05-18 RX ORDER — AMLODIPINE BESYLATE 5 MG/1
1 TABLET ORAL 2 TIMES DAILY
COMMUNITY
Start: 2023-01-23

## 2023-05-18 RX ORDER — NAPROXEN SODIUM 220 MG
220 TABLET ORAL 2 TIMES DAILY WITH MEALS
COMMUNITY